# Patient Record
Sex: FEMALE | Race: WHITE | NOT HISPANIC OR LATINO | Employment: OTHER | URBAN - METROPOLITAN AREA
[De-identification: names, ages, dates, MRNs, and addresses within clinical notes are randomized per-mention and may not be internally consistent; named-entity substitution may affect disease eponyms.]

---

## 2017-02-22 ENCOUNTER — OFFICE VISIT (OUTPATIENT)
Dept: OPHTHALMOLOGY | Facility: CLINIC | Age: 77
End: 2017-02-22
Payer: MEDICARE

## 2017-02-22 DIAGNOSIS — Z96.1 PSEUDOPHAKIA OF BOTH EYES: Primary | ICD-10-CM

## 2017-02-22 DIAGNOSIS — H26.492 POSTERIOR CAPSULAR OPACIFICATION, LEFT: ICD-10-CM

## 2017-02-22 PROCEDURE — 99212 OFFICE O/P EST SF 10 MIN: CPT | Mod: PBBFAC,PO | Performed by: OPHTHALMOLOGY

## 2017-02-22 PROCEDURE — 99999 PR PBB SHADOW E&M-EST. PATIENT-LVL II: CPT | Mod: PBBFAC,,, | Performed by: OPHTHALMOLOGY

## 2017-02-22 PROCEDURE — 66821 AFTER CATARACT LASER SURGERY: CPT | Mod: PBBFAC,PO,RT | Performed by: OPHTHALMOLOGY

## 2017-02-22 PROCEDURE — 92014 COMPRE OPH EXAM EST PT 1/>: CPT | Mod: 57,S$PBB,, | Performed by: OPHTHALMOLOGY

## 2017-02-22 RX ORDER — QUETIAPINE FUMARATE 50 MG/1
TABLET, FILM COATED ORAL
COMMUNITY
Start: 2017-02-20

## 2017-02-22 RX ORDER — PREDNISOLONE ACETATE 10 MG/ML
1 SUSPENSION/ DROPS OPHTHALMIC 4 TIMES DAILY
Qty: 1 BOTTLE | Refills: 2 | Status: SHIPPED | OUTPATIENT
Start: 2017-02-22 | End: 2017-03-01

## 2017-02-22 RX ORDER — SERTRALINE HYDROCHLORIDE 50 MG/1
TABLET, FILM COATED ORAL
COMMUNITY
Start: 2017-02-20

## 2017-02-22 NOTE — MR AVS SNAPSHOT
[facility]  [address]  [place] [address]  Phone: [phone]  Fax: [phone]                  Carlos A Rich   2017 2:15 PM   Office Visit    Description:  Female : 1940   Provider:  Kevin Mustafa MD   Department:  [facility]           Reason for Visit     Eye Exam     Blurred Vision           Diagnoses this Visit        Comments    Pseudophakia of both eyes    -  Primary     Posterior capsular opacification, left                To Do List           Goals (5 Years of Data)     None       These Medications        Disp Refills Start End    prednisoLONE acetate (PRED FORTE) 1 % DrpS 1 Bottle 2 2017 3/1/2017    Place 1 drop into the left eye 4 (four) times daily. - Left Eye    Pharmacy: [facility]/pharmacy #[id number] - [place], [place] - [address] AT [facility] #: [phone]         [facility] On Call     [facility] On Call Nurse Care Line -  Assistance  Registered nurses in the [facility] Center provide clinical advisement, health education, appointment booking, and other advisory services.  Call for this free service at [phone].             Medications           Message regarding Medications     Verify the changes and/or additions to your medication regime listed below are the same as discussed with your clinician today.  If any of these changes or additions are incorrect, please notify your healthcare provider.        START taking these NEW medications        Refills    prednisoLONE acetate (PRED FORTE) 1 % DrpS 2    Sig: Place 1 drop into the left eye 4 (four) times daily.    Class: Normal    Route: Left Eye      STOP taking these medications     tizanidine (ZANAFLEX) 4 MG tablet            Verify that the below list of medications is an accurate representation of the medications you are currently taking.  If none reported, the list may be blank. If incorrect, please contact your healthcare provider.  Carry this list with you in case of emergency.           Current Medications     ASPIRIN/ACETAMINOPHEN/CAFFEINE (EXCEDRIN EXTRA STRENGTH ORAL)     CARBOXYMETHYLCELL/HYPROMELLOSE (GENTEAL GEL OPHT)     clonazepam (KLONOPIN) 0.5 MG tablet Take 0.5 mg by mouth 2 (two) times daily as needed.    ezetimibe (ZETIA) 10 mg tablet Take 1 tablet by mouth Daily.    fenofibrate (TRICOR) 145 MG tablet Take 1 tablet by mouth Daily.    gabapentin (NEURONTIN) 300 MG capsule     hydrocodone-acetaminophen 5-325mg (NORCO) 5-325 mg per tablet     nitrofurantoin (MACRODANTIN) 50 MG capsule Take by mouth.    OMEGA-3S/DHA/EPA/FISH OIL (OMEGA 3 ORAL)     potassium chloride SA (K-DUR,KLOR-CON) 20 MEQ tablet     propranolol (INDERAL [place]) 60 MG 24 hr capsule Take 60 mg by mouth once daily.    quetiapine (SEROQUEL) 50 MG tablet     sertraline (ZOLOFT) 50 MG tablet     VOLTAREN 1 % Gel     white petrolatum-mineral oil (REFRESH P.M.) 57.3-42.5 % Oint Place into the right eye PRN.    prednisoLONE acetate (PRED FORTE) 1 % DrpS Place 1 drop into the left eye 4 (four) times daily.           Clinical Reference Information           Allergies as of 2/22/2017     Contrast Media    Influenza Virus Vaccine Trivalent [id number]    Latex    Penicillins      Immunizations Administered on Date of Encounter - 2/22/2017     None      Orders Placed During Today's Visit      Normal Orders This Visit    Yag Capsulotomy - OS       [facility] Sign-Up     Activating your [facility] account is as easy as 1-2-3!     1) Visit [email or web address], select Sign Up Now, enter this activation code and your date of birth, then select Next.  [email or web address]  Expires: 4/8/2017  3:47 PM      2) Create a username and password to use when you visit [facility] in the future and select a security question in case you lose your password and select Next.    3) Enter your e-mail address and click Sign Up!    Additional Information  If you have questions, please e-mail [email or web address] or  call [phone] to talk to our [facility] staff. Remember, [facility] is NOT to be used for urgent needs. For medical emergencies, dial 911.         Language Assistance Services     ATTENTION: Language assistance services are available, free of charge. Please call [phone].      ATENCIÓN: Si habla giacomo, tiene a khan disposición servicios gratuitos de asistencia lingüística. Llame al [phone].     [facility] Ý: N?u b?n nói Ti?ng Vi?t, có các d?ch v? h? tr? ngôn ng? mi?n phí dành cho b?n. G?i s? [phone].         [facility] complies with applicable Federal civil rights laws and does not discriminate on the basis of race, color, national origin, age, disability, or sex.

## 2017-02-22 NOTE — PROGRESS NOTES
SUBJECTIVE:   Carlos A Rich is a 76 y.o. female   Corrected distance visual acuity was 20/40 -2 in the right eye and 20/25 -1 in the left eye.   Chief Complaint   Patient presents with    Eye Exam     yearly     Blurred Vision     has questions about yag procedure.         HPI:  HPI     Eye Exam    Additional comments: yearly            Blurred Vision    Additional comments: has questions about yag procedure.            Comments   1. PCIOL OD 12/2010  PCIOL OS 11/2010  2. Dry Eyes  3. H/o hayfever conjunctivitis    Refresh PRN OU       Last edited by Aixa Junior MA on 2/22/2017  2:53 PM. (History)        Assessment /Plan :  1. Pseudophakia of both eyes well   2. Posterior capsular opacification, left   Yag Capsulotomy Procedure:    76 y.o. year old patient experiencing a symptomatic decrease in vision OS with inability to perform activities of daily living including reading.    SLE: Posterior intraocular lens implant with capsular fibrosis     Risks, benefits and alternatives of Yag Laser Capsulotomy discussed. Including risks of retinal detachment (1-3%), macular edema, dislocated implant, pain, inflammation elevated intraocular pressure and vision loss. Consent signed.    Medications given:  Apraclonidine gtt  Tetracaine gtt    Laser energy settings:  2.4  mJ / burst  86  bursts    IMPRESSION:  Yag Capsulotomy OS well tolerated    PLAN:  1. Prednisolone 1% QID over 1 week  2. Postoperative precautions discussed  3. RTC 3 weeks or prn

## 2017-03-22 ENCOUNTER — OFFICE VISIT (OUTPATIENT)
Dept: OPHTHALMOLOGY | Facility: CLINIC | Age: 77
End: 2017-03-22
Payer: MEDICARE

## 2017-03-22 DIAGNOSIS — Z98.890 POST-OPERATIVE STATE: ICD-10-CM

## 2017-03-22 DIAGNOSIS — Z96.1 PSEUDOPHAKIA: Primary | ICD-10-CM

## 2017-03-22 PROCEDURE — 99024 POSTOP FOLLOW-UP VISIT: CPT | Mod: ,,, | Performed by: OPHTHALMOLOGY

## 2017-03-22 PROCEDURE — 99999 PR PBB SHADOW E&M-EST. PATIENT-LVL II: CPT | Mod: PBBFAC,,, | Performed by: OPHTHALMOLOGY

## 2017-03-22 PROCEDURE — 99212 OFFICE O/P EST SF 10 MIN: CPT | Mod: PBBFAC,PO | Performed by: OPHTHALMOLOGY

## 2017-03-22 RX ORDER — PREDNISOLONE ACETATE 10 MG/ML
SUSPENSION/ DROPS OPHTHALMIC
Refills: 2 | COMMUNITY
Start: 2017-03-18

## 2017-03-22 NOTE — PROGRESS NOTES
SUBJECTIVE:   Carlos A Rich is a 76 y.o. female   Corrected distance visual acuity was 20/40 in the right eye and 20/30 -2 in the left eye.   Chief Complaint   Patient presents with    Post-op Evaluation     3 week PO YAG OS 2/22/17, Pred Acetate QID OS        HPI:  HPI     Post-op Evaluation    Additional comments: 3 week PO YAG OS 2/22/17, Pred Acetate QID OS           Comments   Pt states she's still using Pred Acetate QID OS. Does complain of   headaches over the left eye, blurry vision. No problems with the right   eye.     1. PCIOL OD 12/2010  PCIOL OS 11/2010  PCO bilateral   2. Dry Eyes  3. H/o hayfever conjunctivitis    Refresh PRN OU  Pred Acetate QID OS       Last edited by Rosendo Pinedo on 3/22/2017  2:52 PM. (History)        Assessment /Plan :  1. Pseudophakia    2. Post-operative state Exam:  SLE:  L/L- normal  S/C- white  K- stable  AC- no cells  LENS- PCIOL with clear capsulotomy    Assessment:    S/P Yag Capsulotomy OS . Discussed options for spectacle correction and pt elects to use current Rx. Recommend follow with Dr. Mustafa in one year, or sooner if needed

## 2017-03-22 NOTE — MR AVS SNAPSHOT
[facility]  [address] [place] [address]  Phone: [phone]  Fax: [phone]                  Carlos A Rich   3/22/2017 2:15 PM   Office Visit    Description:  Female : 1940   Provider:  Kevin Mustafa MD   Department:  [facility]           Reason for Visit     Post-op Evaluation           Diagnoses this Visit        Comments    Pseudophakia    -  Primary     Post-operative state                To Do List           Goals (5 Years of Data)     None      [facility]     [facility] Nurse Care Line -  Assistance  Registered nurses in the [facility] On Call Center provide clinical advisement, health education, appointment booking, and other advisory services.  Call for this free service at [phone].             Medications           Message regarding Medications     Verify the changes and/or additions to your medication regime listed below are the same as discussed with your clinician today.  If any of these changes or additions are incorrect, please notify your healthcare provider.             Verify that the below list of medications is an accurate representation of the medications you are currently taking.  If none reported, the list may be blank. If incorrect, please contact your healthcare provider. Carry this list with you in case of emergency.           Current Medications     ASPIRIN/ACETAMINOPHEN/CAFFEINE (EXCEDRIN EXTRA STRENGTH ORAL)     CARBOXYMETHYLCELL/HYPROMELLOSE (GENTEAL GEL OPHT)     clonazepam (KLONOPIN) 0.5 MG tablet Take 0.5 mg by mouth 2 (two) times daily as needed.    ezetimibe (ZETIA) 10 mg tablet Take 1 tablet by mouth Daily.    fenofibrate (TRICOR) 145 MG tablet Take 1 tablet by mouth Daily.    gabapentin (NEURONTIN) 300 MG capsule     hydrocodone-acetaminophen 5-325mg (NORCO) 5-325 mg per tablet     nitrofurantoin (MACRODANTIN) 50 MG capsule Take by mouth.    OMEGA-3S/DHA/EPA/FISH OIL (OMEGA 3 ORAL)     potassium  chloride SA (K-DUR,KLOR-CON) 20 MEQ tablet     propranolol (INDERAL [place]) 60 MG 24 hr capsule Take 60 mg by mouth once daily.    quetiapine (SEROQUEL) 50 MG tablet     sertraline (ZOLOFT) 50 MG tablet     VOLTAREN 1 % Gel     white petrolatum-mineral oil (REFRESH P.M.) 57.3-42.5 % Oint Place into the right eye PRN.    prednisoLONE acetate (PRED FORTE) 1 % DrpS PLACE 1 DROP INTO THE LEFT EYE 4 (FOUR) TIMES DAILY.           Clinical Reference Information           Allergies as of 3/22/2017     Contrast Media    Influenza Virus Vaccine Trivalent [id number]    Latex    Penicillins      Immunizations Administered on Date of Encounter - 3/22/2017     None      [facility] Sign-Up     Activating your [facility] account is as easy as 1-2-3!     1) Visit [email or web address], select Sign Up Now, enter this activation code and your date of birth, then select Next.  [email or web address]  Expires: 4/8/2017  4:47 PM      2) Create a username and password to use when you visit [facility] in the future and select a security question in case you lose your password and select Next.    3) Enter your e-mail address and click Sign Up!    Additional Information  If you have questions, please e-mail [email or web address] or call [phone] to talk to our [facility] staff. Remember, [facility] is NOT to be used for urgent needs. For medical emergencies, dial 911.         Language Assistance Services     ATTENTION: Language assistance services are available, free of charge. Please call [phone].      ATENCIÓN: Si habla español, tiene a khan disposición servicios gratuitos de asistencia lingüística. Llame al [phone].     CHÚ Ý: N?u b?n nói Ti?ng Vi?t, có các d?ch v? h? tr? ngôn ng? mi?n phí dành cho b?n. G?i s? [phone].         [facility] complies with applicable Federal civil rights laws and does not discriminate on the basis of race, color, national origin, age, disability, or sex.

## 2018-04-02 ENCOUNTER — OFFICE VISIT (OUTPATIENT)
Dept: OPHTHALMOLOGY | Facility: CLINIC | Age: 78
End: 2018-04-02
Payer: MEDICARE

## 2018-04-02 DIAGNOSIS — H04.129 DRY EYE: Primary | ICD-10-CM

## 2018-04-02 DIAGNOSIS — H01.00B BLEPHARITIS OF UPPER AND LOWER EYELIDS OF BOTH EYES, UNSPECIFIED TYPE: ICD-10-CM

## 2018-04-02 DIAGNOSIS — Z96.1 PSEUDOPHAKIA OF BOTH EYES: ICD-10-CM

## 2018-04-02 DIAGNOSIS — H01.00A BLEPHARITIS OF UPPER AND LOWER EYELIDS OF BOTH EYES, UNSPECIFIED TYPE: ICD-10-CM

## 2018-04-02 PROCEDURE — 99999 PR PBB SHADOW E&M-EST. PATIENT-LVL II: CPT | Mod: PBBFAC,,, | Performed by: OPHTHALMOLOGY

## 2018-04-02 PROCEDURE — 92014 COMPRE OPH EXAM EST PT 1/>: CPT | Mod: S$PBB,,, | Performed by: OPHTHALMOLOGY

## 2018-04-02 PROCEDURE — 99212 OFFICE O/P EST SF 10 MIN: CPT | Mod: PBBFAC,PO | Performed by: OPHTHALMOLOGY

## 2018-04-02 NOTE — PROGRESS NOTES
SUBJECTIVE:   Carlos A Rich is a 77 y.o. female   Corrected distance visual acuity was 20/25 -1 in the right eye and 20/30 in the left eye.   Chief Complaint   Patient presents with    Dry Eye     1 year RTC, refresh drops prn ou, gel qhs prn ou        HPI:  HPI     Dry Eye    Additional comments: 1 year RTC, refresh drops prn ou, gel qhs prn ou           Comments   Pt states she's still doing well with her current glasses, defer   refraction today. Did not want to be dilated this visit. Using refresh   drops and gel at times for dryness, the right eye seems worse than the   left. No pain or irritation today.     1. PCIOL OD 12/2010  PCIOL OS 11/2010 + Yag OS 3/17  PCO OD  2. Dry Eyes  3. H/o hayfever conjunctivitis    Refresh PRN OU       Last edited by Rosendo Pinedo on 4/2/2018  2:24 PM. (History)        Assessment /Plan :  1. Dry eye cont AT'S qid ou   2. Blepharitis of upper and lower eyelids of both eyes, unspecified type Findings and symptoms consistent with mild blepharitis. The blepharitis instruction sheet was reviewed with the pt, recommending:Warm compresses and Gentle Lid Scrubs     3. Pseudophakia of both eyes  -- Condition stable, no therapeutic change required. Monitoring routinely.       RTC 1 Year

## 2019-04-10 ENCOUNTER — OFFICE VISIT (OUTPATIENT)
Dept: OPHTHALMOLOGY | Facility: CLINIC | Age: 79
End: 2019-04-10
Payer: MEDICARE

## 2019-04-10 DIAGNOSIS — Z96.1 PSEUDOPHAKIA OF BOTH EYES: Primary | ICD-10-CM

## 2019-04-10 DIAGNOSIS — H04.129 DRY EYE: ICD-10-CM

## 2019-04-10 PROCEDURE — 92012 INTRM OPH EXAM EST PATIENT: CPT | Mod: S$PBB,,, | Performed by: OPHTHALMOLOGY

## 2019-04-10 PROCEDURE — 99999 PR PBB SHADOW E&M-EST. PATIENT-LVL II: ICD-10-PCS | Mod: PBBFAC,,, | Performed by: OPHTHALMOLOGY

## 2019-04-10 PROCEDURE — 99999 PR PBB SHADOW E&M-EST. PATIENT-LVL II: CPT | Mod: PBBFAC,,, | Performed by: OPHTHALMOLOGY

## 2019-04-10 PROCEDURE — 99212 OFFICE O/P EST SF 10 MIN: CPT | Mod: PBBFAC,PN | Performed by: OPHTHALMOLOGY

## 2019-04-10 PROCEDURE — 92012 PR EYE EXAM, EST PATIENT,INTERMED: ICD-10-PCS | Mod: S$PBB,,, | Performed by: OPHTHALMOLOGY

## 2019-04-10 NOTE — PROGRESS NOTES
SUBJECTIVE:   Carlos A Rich is a 78 y.o. female   Corrected distance visual acuity was 20/25 -2 in the right eye and 20/30 in the left eye.   Chief Complaint   Patient presents with    Annual Exam     pt states no complaints doing well just have to depend on glasses 24/7        HPI:  HPI     Annual Exam      Additional comments: pt states no complaints doing well just have to   depend on glasses 24/7              Comments     1. PCIOL OD 12/2010  PCIOL OS 11/2010 + Yag OS 3/17  PCO OD  2. Dry Eyes  3. H/o hayfever conjunctivitis    Refresh PRN OU          Last edited by Taya Zelaya MA on 4/10/2019  2:17 PM. (History)        Assessment /Plan :  1. Pseudophakia of both eyes  -- Condition stable, no therapeutic change required. Monitoring routinely.     2. Dry eye  -- Condition stable, no therapeutic change required. Monitoring routinely.       RTC in 1 year or prn any changes